# Patient Record
Sex: MALE | Race: WHITE | NOT HISPANIC OR LATINO | URBAN - METROPOLITAN AREA
[De-identification: names, ages, dates, MRNs, and addresses within clinical notes are randomized per-mention and may not be internally consistent; named-entity substitution may affect disease eponyms.]

---

## 2017-02-07 ENCOUNTER — OFFICE (OUTPATIENT)
Dept: URBAN - METROPOLITAN AREA CLINIC 33 | Facility: CLINIC | Age: 52
End: 2017-02-07

## 2017-02-07 VITALS
WEIGHT: 209 LBS | HEART RATE: 79 BPM | SYSTOLIC BLOOD PRESSURE: 124 MMHG | HEIGHT: 74 IN | DIASTOLIC BLOOD PRESSURE: 77 MMHG | TEMPERATURE: 97.2 F

## 2017-02-07 DIAGNOSIS — R74.8 ABNORMAL LEVELS OF OTHER SERUM ENZYMES: ICD-10-CM

## 2017-02-07 DIAGNOSIS — K51.90 ULCERATIVE COLITIS, UNSPECIFIED, WITHOUT COMPLICATIONS: ICD-10-CM

## 2017-02-07 PROCEDURE — 99214 OFFICE O/P EST MOD 30 MIN: CPT

## 2018-08-10 ENCOUNTER — OFFICE (OUTPATIENT)
Dept: URBAN - METROPOLITAN AREA CLINIC 33 | Facility: CLINIC | Age: 53
End: 2018-08-10

## 2018-08-10 VITALS
HEART RATE: 75 BPM | TEMPERATURE: 97.9 F | WEIGHT: 204 LBS | HEIGHT: 74 IN | SYSTOLIC BLOOD PRESSURE: 141 MMHG | DIASTOLIC BLOOD PRESSURE: 87 MMHG

## 2018-08-10 DIAGNOSIS — K51.90 ULCERATIVE COLITIS, UNSPECIFIED, WITHOUT COMPLICATIONS: ICD-10-CM

## 2018-08-10 DIAGNOSIS — Z79.899 OTHER LONG TERM (CURRENT) DRUG THERAPY: ICD-10-CM

## 2018-08-10 PROCEDURE — 99214 OFFICE O/P EST MOD 30 MIN: CPT

## 2018-08-10 RX ORDER — MESALAMINE 1.2 G/1
TABLET, DELAYED RELEASE ORAL
Qty: 270 | Refills: 3 | Status: ACTIVE

## 2018-08-10 NOTE — SERVICEHPINOTES
54 yo white male presents for f/u ulcerative colitis, diagnosed in 2009 (pan-colitis). He has had a fairly mild disease course with his last 2 colonoscopies in 2015 and 2011 without active colitis findings. He takes generic Lialda, 3 PO daily. He is doing well. Has a daily BM, no blood in stools. He denies abdominal pain, fevers, weight loss, joint pains, eye symptoms. His weight is stable. Prior hx: Patient was originally diagnosed with possible UC in 2009 and was treated with prednisone and Asacol. His symptoms resolved and he DC'd the Asacol and did well until summer 2011 when he developed bloody diarrhea and some lower abdominal discomfort. Was again treated with prednisone and Asacol HD and another colonoscopy did confirm ulcerative colitis. Symptoms resolved and he has stayed on mesalamine since then. He was diagnosed with a-fib in 2014. Had cardioversion and then ablation and has been in NSR for several years. Takes ASA 81 mg. Follows Dr. Garza-cardiologist in [place]. Denies chest pain, PELAYO and heart palpitations.

## 2018-08-11 LAB
CBC (INCLUDES DIFF/PLT): ABSOLUTE BAND NEUTROPHILS: NORMAL CELLS/UL
CBC (INCLUDES DIFF/PLT): ABSOLUTE BASOPHILS: 30 CELLS/UL (ref 0–200)
CBC (INCLUDES DIFF/PLT): ABSOLUTE BLASTS: NORMAL CELLS/UL
CBC (INCLUDES DIFF/PLT): ABSOLUTE EOSINOPHILS: 190 CELLS/UL (ref 15–500)
CBC (INCLUDES DIFF/PLT): ABSOLUTE LYMPHOCYTES: 1892 CELLS/UL (ref 850–3900)
CBC (INCLUDES DIFF/PLT): ABSOLUTE METAMYELOCYTES: NORMAL CELLS/UL
CBC (INCLUDES DIFF/PLT): ABSOLUTE MONOCYTES: 958 CELLS/UL — HIGH (ref 200–950)
CBC (INCLUDES DIFF/PLT): ABSOLUTE MYELOCYTES: NORMAL CELLS/UL
CBC (INCLUDES DIFF/PLT): ABSOLUTE NEUTROPHILS: 4530 CELLS/UL (ref 1500–7800)
CBC (INCLUDES DIFF/PLT): ABSOLUTE NUCLEATED RBC: NORMAL CELLS/UL
CBC (INCLUDES DIFF/PLT): ABSOLUTE PROMYELOCYTES: NORMAL CELLS/UL
CBC (INCLUDES DIFF/PLT): BAND NEUTROPHILS: NORMAL %
CBC (INCLUDES DIFF/PLT): BASOPHILS: 0.4 %
CBC (INCLUDES DIFF/PLT): BLASTS: NORMAL %
CBC (INCLUDES DIFF/PLT): COMMENT(S): NORMAL
CBC (INCLUDES DIFF/PLT): EOSINOPHILS: 2.5 %
CBC (INCLUDES DIFF/PLT): HEMATOCRIT: 43.4 % (ref 38.5–50)
CBC (INCLUDES DIFF/PLT): HEMOGLOBIN: 14.4 G/DL (ref 13.2–17.1)
CBC (INCLUDES DIFF/PLT): LYMPHOCYTES: 24.9 %
CBC (INCLUDES DIFF/PLT): MCH: 31.9 PG (ref 27–33)
CBC (INCLUDES DIFF/PLT): MCHC: 33.2 G/DL (ref 32–36)
CBC (INCLUDES DIFF/PLT): MCV: 96 FL (ref 80–100)
CBC (INCLUDES DIFF/PLT): METAMYELOCYTES: NORMAL %
CBC (INCLUDES DIFF/PLT): MONOCYTES: 12.6 %
CBC (INCLUDES DIFF/PLT): MPV: 11.7 FL (ref 7.5–12.5)
CBC (INCLUDES DIFF/PLT): MYELOCYTES: NORMAL %
CBC (INCLUDES DIFF/PLT): NEUTROPHILS: 59.6 %
CBC (INCLUDES DIFF/PLT): NUCLEATED RBC: NORMAL /100 WBC
CBC (INCLUDES DIFF/PLT): PLATELET COUNT: 234 THOUSAND/UL (ref 140–400)
CBC (INCLUDES DIFF/PLT): PROMYELOCYTES: NORMAL %
CBC (INCLUDES DIFF/PLT): RDW: 12.6 % (ref 11–15)
CBC (INCLUDES DIFF/PLT): REACTIVE LYMPHOCYTES: NORMAL %
CBC (INCLUDES DIFF/PLT): RED BLOOD CELL COUNT: 4.52 MILLION/UL (ref 4.2–5.8)
CBC (INCLUDES DIFF/PLT): WHITE BLOOD CELL COUNT: 7.6 THOUSAND/UL (ref 3.8–10.8)
COMPREHENSIVE METABOLIC PANEL: ALBUMIN/GLOBULIN RATIO: 2.1 (CALC) (ref 1–2.5)
COMPREHENSIVE METABOLIC PANEL: ALBUMIN: 4.7 G/DL (ref 3.6–5.1)
COMPREHENSIVE METABOLIC PANEL: ALKALINE PHOSPHATASE: 70 U/L (ref 40–115)
COMPREHENSIVE METABOLIC PANEL: ALT: 17 U/L (ref 9–46)
COMPREHENSIVE METABOLIC PANEL: AST: 17 U/L (ref 10–35)
COMPREHENSIVE METABOLIC PANEL: BILIRUBIN, TOTAL: 0.4 MG/DL (ref 0.2–1.2)
COMPREHENSIVE METABOLIC PANEL: BUN/CREATININE RATIO: (no result) (CALC)
COMPREHENSIVE METABOLIC PANEL: CALCIUM: 9.6 MG/DL (ref 8.6–10.3)
COMPREHENSIVE METABOLIC PANEL: CARBON DIOXIDE: 27 MMOL/L (ref 20–32)
COMPREHENSIVE METABOLIC PANEL: CHLORIDE: 104 MMOL/L (ref 98–110)
COMPREHENSIVE METABOLIC PANEL: CREATININE: 0.94 MG/DL (ref 0.7–1.33)
COMPREHENSIVE METABOLIC PANEL: EGFR AFRICAN AMERICAN: 108 ML/MIN/1.73M2 (ref 60–?)
COMPREHENSIVE METABOLIC PANEL: EGFR NON-AFR. AMERICAN: 93 ML/MIN/1.73M2 (ref 60–?)
COMPREHENSIVE METABOLIC PANEL: GLOBULIN: 2.2 G/DL (CALC) (ref 1.9–3.7)
COMPREHENSIVE METABOLIC PANEL: GLUCOSE: 98 MG/DL (ref 65–99)
COMPREHENSIVE METABOLIC PANEL: POTASSIUM: 4.2 MMOL/L (ref 3.5–5.3)
COMPREHENSIVE METABOLIC PANEL: PROTEIN, TOTAL: 6.9 G/DL (ref 6.1–8.1)
COMPREHENSIVE METABOLIC PANEL: SODIUM: 141 MMOL/L (ref 135–146)
COMPREHENSIVE METABOLIC PANEL: UREA NITROGEN (BUN): 14 MG/DL (ref 7–25)

## 2018-11-05 ENCOUNTER — OFFICE (OUTPATIENT)
Dept: URBAN - METROPOLITAN AREA CLINIC 32 | Facility: CLINIC | Age: 53
End: 2018-11-05

## 2018-11-05 ENCOUNTER — OFFICE (OUTPATIENT)
Dept: URBAN - METROPOLITAN AREA PATHOLOGY 17 | Facility: PATHOLOGY | Age: 53
End: 2018-11-05

## 2018-11-05 VITALS
WEIGHT: 204 LBS | SYSTOLIC BLOOD PRESSURE: 131 MMHG | SYSTOLIC BLOOD PRESSURE: 110 MMHG | DIASTOLIC BLOOD PRESSURE: 64 MMHG | RESPIRATION RATE: 149 BRPM | SYSTOLIC BLOOD PRESSURE: 105 MMHG | RESPIRATION RATE: 16 BRPM | DIASTOLIC BLOOD PRESSURE: 69 MMHG | HEART RATE: 61 BPM | HEART RATE: 60 BPM | DIASTOLIC BLOOD PRESSURE: 76 MMHG | HEART RATE: 66 BPM | OXYGEN SATURATION: 96 % | SYSTOLIC BLOOD PRESSURE: 133 MMHG | SYSTOLIC BLOOD PRESSURE: 127 MMHG | SYSTOLIC BLOOD PRESSURE: 120 MMHG | SYSTOLIC BLOOD PRESSURE: 106 MMHG | DIASTOLIC BLOOD PRESSURE: 80 MMHG | HEART RATE: 65 BPM | DIASTOLIC BLOOD PRESSURE: 74 MMHG | TEMPERATURE: 97.5 F | HEIGHT: 74 IN | HEART RATE: 62 BPM | DIASTOLIC BLOOD PRESSURE: 63 MMHG | DIASTOLIC BLOOD PRESSURE: 82 MMHG | SYSTOLIC BLOOD PRESSURE: 115 MMHG | HEART RATE: 67 BPM | OXYGEN SATURATION: 99 % | OXYGEN SATURATION: 100 % | HEART RATE: 70 BPM | TEMPERATURE: 97.9 F | DIASTOLIC BLOOD PRESSURE: 67 MMHG | RESPIRATION RATE: 20 BRPM | OXYGEN SATURATION: 98 % | RESPIRATION RATE: 15 BRPM | HEART RATE: 59 BPM

## 2018-11-05 DIAGNOSIS — K51.00 ULCERATIVE (CHRONIC) PANCOLITIS WITHOUT COMPLICATIONS: ICD-10-CM

## 2018-11-05 DIAGNOSIS — K52.89 OTHER SPECIFIED NONINFECTIVE GASTROENTERITIS AND COLITIS: ICD-10-CM

## 2018-11-05 PROBLEM — K52.9 INFLAMMATORY BOWEL DISEASE OF THE INTESTINE IF MORE PRECISE DIAGNOSIS OR DETERMINATION OF THE EXTENT/SEVERITY OF ACTIVITY OF DISEASE WILL INFLUENCE IMMEDIATE/FUTURE MANAGEMENT: Status: ACTIVE | Noted: 2018-11-05

## 2018-11-05 PROCEDURE — 45380 COLONOSCOPY AND BIOPSY: CPT

## 2018-11-05 PROCEDURE — 88305 TISSUE EXAM BY PATHOLOGIST: CPT

## 2019-12-09 ENCOUNTER — OFFICE (OUTPATIENT)
Dept: URBAN - METROPOLITAN AREA CLINIC 78 | Facility: CLINIC | Age: 54
End: 2019-12-09

## 2019-12-09 VITALS
HEART RATE: 77 BPM | TEMPERATURE: 97.9 F | DIASTOLIC BLOOD PRESSURE: 88 MMHG | SYSTOLIC BLOOD PRESSURE: 151 MMHG | HEIGHT: 74 IN | WEIGHT: 197 LBS

## 2019-12-09 DIAGNOSIS — K51.90 ULCERATIVE COLITIS, UNSPECIFIED, WITHOUT COMPLICATIONS: ICD-10-CM

## 2019-12-09 PROCEDURE — 99214 OFFICE O/P EST MOD 30 MIN: CPT

## 2019-12-09 RX ORDER — MESALAMINE 1.2 G/1
TABLET, DELAYED RELEASE ORAL
Qty: 360 | Refills: 3 | Status: COMPLETED
End: 2024-06-07

## 2019-12-09 NOTE — SERVICEHPINOTES
CLAUDE MENDOZA   is a   54   male who presents for follow up. Hx of pancolitis (UC), diagnosed in 2009. Has been taking mesalamine 1.2 gram 3 tabs daily and has been doing well. BRLast colonoscopy was done in 11/2018 and it was unremarkable including biopsies.BRMoves bowel once daily. Denies blood in stool, melena, diarrhea or weight loss. BROccasional gas pain. Eats well. Denies nausea, vomiting, dysphagia, acid reflux or dyspepsia. BR

## 2020-04-17 ENCOUNTER — OFFICE (OUTPATIENT)
Dept: URBAN - METROPOLITAN AREA CLINIC 34 | Facility: CLINIC | Age: 55
End: 2020-04-17

## 2020-04-17 VITALS — WEIGHT: 195 LBS | HEIGHT: 74 IN

## 2020-04-17 DIAGNOSIS — R19.7 DIARRHEA, UNSPECIFIED: ICD-10-CM

## 2020-04-17 DIAGNOSIS — K63.3 ULCER OF INTESTINE: ICD-10-CM

## 2020-04-17 DIAGNOSIS — K92.1 MELENA: ICD-10-CM

## 2020-04-17 PROCEDURE — 99214 OFFICE O/P EST MOD 30 MIN: CPT | Mod: GQ | Performed by: NURSE PRACTITIONER

## 2020-04-17 NOTE — SERVICENOTES
I have reviewed the history, physical exam, assessment and management plans.  I concur with or have edited all elements of her note.
Patient's visit was conducted through zoom telecommunication. Patient consented before the start of visit as to understanding of privacy concerns, possible technological failure, and their responsibility of carrying out instructions of plan.

## 2020-04-17 NOTE — SERVICEHPINOTES
PATIENT VERIFIED BY DATE OF BIRTH AND NAME. Patient has been consented for this telecommunication visit. Has been experiencing flare up symptoms since 3 weeks ago. BRMoves bowel up to 5-10 times daily. Stool consistency is between type 4-7. + Bloody stools. Slight pain at lower abdomen. + Mucus in stools and occasional urgency to move bowel. Denies nocturnal diarrhea or weight loss. Denies taking NSAIDs. BRDenies nausea, vomiting, dysphagia, acid reflux or epigastric pain. BRDenies recent medication changes or antibiotic use. Denies sick person contact or recent traveling. BRHas been taking mesalamine 1.2 gram, 3 tabs daily. BRJoint pain and muscle pain. ROS as stated above, all others negative. BR

## 2020-04-21 LAB
AMBIG ABBREV CMP14 DEFAULT: (no result)
C-REACTIVE PROTEIN, QUANT: 20 MG/L — HIGH (ref 0–10)
CBC WITH DIFFERENTIAL/PLATELET: BASO (ABSOLUTE): 0.1 X10E3/UL (ref 0–0.2)
CBC WITH DIFFERENTIAL/PLATELET: BASOS: 1 %
CBC WITH DIFFERENTIAL/PLATELET: EOS (ABSOLUTE): 0.3 X10E3/UL (ref 0–0.4)
CBC WITH DIFFERENTIAL/PLATELET: EOS: 3 %
CBC WITH DIFFERENTIAL/PLATELET: HEMATOCRIT: 41.5 % (ref 37.5–51)
CBC WITH DIFFERENTIAL/PLATELET: HEMOGLOBIN: 13.9 G/DL (ref 13–17.7)
CBC WITH DIFFERENTIAL/PLATELET: IMMATURE GRANS (ABS): 0 X10E3/UL (ref 0–0.1)
CBC WITH DIFFERENTIAL/PLATELET: IMMATURE GRANULOCYTES: 0 %
CBC WITH DIFFERENTIAL/PLATELET: LYMPHS (ABSOLUTE): 1.8 X10E3/UL (ref 0.7–3.1)
CBC WITH DIFFERENTIAL/PLATELET: LYMPHS: 17 %
CBC WITH DIFFERENTIAL/PLATELET: MCH: 31.8 PG (ref 26.6–33)
CBC WITH DIFFERENTIAL/PLATELET: MCHC: 33.5 G/DL (ref 31.5–35.7)
CBC WITH DIFFERENTIAL/PLATELET: MCV: 95 FL (ref 79–97)
CBC WITH DIFFERENTIAL/PLATELET: MONOCYTES(ABSOLUTE): 1.6 X10E3/UL — HIGH (ref 0.1–0.9)
CBC WITH DIFFERENTIAL/PLATELET: MONOCYTES: 14 %
CBC WITH DIFFERENTIAL/PLATELET: NEUTROPHILS (ABSOLUTE): 7.3 X10E3/UL — HIGH (ref 1.4–7)
CBC WITH DIFFERENTIAL/PLATELET: NEUTROPHILS: 65 %
CBC WITH DIFFERENTIAL/PLATELET: PLATELETS: 309 X10E3/UL (ref 150–450)
CBC WITH DIFFERENTIAL/PLATELET: RBC: 4.37 X10E6/UL (ref 4.14–5.8)
CBC WITH DIFFERENTIAL/PLATELET: RDW: 12.2 % (ref 11.6–15.4)
CBC WITH DIFFERENTIAL/PLATELET: WBC: 11.1 X10E3/UL — HIGH (ref 3.4–10.8)
CELIAC DISEASE COMPREHENSIVE: DEAMIDATED GLIADIN ABS, IGA: 5 UNITS (ref 0–19)
CELIAC DISEASE COMPREHENSIVE: DEAMIDATED GLIADIN ABS, IGG: 2 UNITS (ref 0–19)
CELIAC DISEASE COMPREHENSIVE: ENDOMYSIAL ANTIBODY IGA: NEGATIVE
CELIAC DISEASE COMPREHENSIVE: IMMUNOGLOBULIN A, QN, SERUM: 216 MG/DL (ref 90–386)
CELIAC DISEASE COMPREHENSIVE: T-TRANSGLUTAMINASE (TTG) IGA: <2 U/ML
CELIAC DISEASE COMPREHENSIVE: T-TRANSGLUTAMINASE (TTG) IGG: 3 U/ML (ref 0–5)
COMP. METABOLIC PANEL (14): A/G RATIO: 2 (ref 1.2–2.2)
COMP. METABOLIC PANEL (14): ALBUMIN: 4.7 G/DL (ref 3.8–4.9)
COMP. METABOLIC PANEL (14): ALKALINE PHOSPHATASE: 110 IU/L (ref 39–117)
COMP. METABOLIC PANEL (14): ALT (SGPT): 30 IU/L (ref 0–44)
COMP. METABOLIC PANEL (14): AST (SGOT): 25 IU/L (ref 0–40)
COMP. METABOLIC PANEL (14): BILIRUBIN, TOTAL: <0.2 MG/DL
COMP. METABOLIC PANEL (14): BUN/CREATININE RATIO: 19 (ref 9–20)
COMP. METABOLIC PANEL (14): BUN: 15 MG/DL (ref 6–24)
COMP. METABOLIC PANEL (14): CALCIUM: 10.1 MG/DL (ref 8.7–10.2)
COMP. METABOLIC PANEL (14): CARBON DIOXIDE, TOTAL: 29 MMOL/L (ref 20–29)
COMP. METABOLIC PANEL (14): CHLORIDE: 99 MMOL/L (ref 96–106)
COMP. METABOLIC PANEL (14): CREATININE: 0.79 MG/DL (ref 0.76–1.27)
COMP. METABOLIC PANEL (14): EGFR IF AFRICN AM: 119 ML/MIN/1.73 (ref 59–?)
COMP. METABOLIC PANEL (14): EGFR IF NONAFRICN AM: 103 ML/MIN/1.73 (ref 59–?)
COMP. METABOLIC PANEL (14): GLOBULIN, TOTAL: 2.3 G/DL (ref 1.5–4.5)
COMP. METABOLIC PANEL (14): GLUCOSE: 102 MG/DL — HIGH (ref 65–99)
COMP. METABOLIC PANEL (14): POTASSIUM: 4.6 MMOL/L (ref 3.5–5.2)
COMP. METABOLIC PANEL (14): PROTEIN, TOTAL: 7 G/DL (ref 6–8.5)
COMP. METABOLIC PANEL (14): SODIUM: 141 MMOL/L (ref 134–144)
SEDIMENTATION RATE-WESTERGREN: 13 MM/HR (ref 0–30)

## 2020-04-22 LAB
C DIFFICILE TOXIN GENE NAA: NEGATIVE
CALPROTECTIN, FECAL: 341 UG/G — HIGH (ref 0–120)
RESULT: RESULT 1: (no result)
RESULT: RESULT 1: (no result)
STOOL CULTURE: CAMPYLOBACTER CULTURE: (no result)
STOOL CULTURE: E COLI SHIGA TOXIN EIA: NEGATIVE
STOOL CULTURE: SALMONELLA/SHIGELLA SCREEN: (no result)

## 2020-06-18 ENCOUNTER — OFFICE (OUTPATIENT)
Dept: URBAN - METROPOLITAN AREA TELEHEALTH 12 | Facility: TELEHEALTH | Age: 55
End: 2020-06-18

## 2020-06-18 VITALS — HEIGHT: 74 IN | WEIGHT: 185 LBS

## 2020-06-18 DIAGNOSIS — K51.80 OTHER ULCERATIVE COLITIS WITHOUT COMPLICATIONS: ICD-10-CM

## 2020-06-18 PROCEDURE — 99215 OFFICE O/P EST HI 40 MIN: CPT | Mod: GQ

## 2020-06-18 NOTE — SERVICENOTES
I have reviewed the history, physical exam, assessment and management plans.  I concur with or have edited all elements of her note.

 Patient's visit was conducted through video telecommunication. Patient consented before the start of visit as to understanding of privacy concerns, possible technological failure, and their responsibility of carrying out instructions of plan.

## 2020-06-18 NOTE — SERVICEHPINOTES
PATIENT VERIFIED BY DATE OF BIRTH AND NAME. Patient has been consented for this telecommunication visit. BRPt with h/o pancolitis dx in 2009. He was well controlled on mesalamine with only 4-5 flares since diagnosis until recently. Started having flare-type symptoms in April. Stool studies showed fecal calpro of 341, neg c diff CRP of 20. He was given a slow prednisone taper and finished this approx. 2 weeks ago with ongoing symptoms. States he felt better for only a couple days after starting prednisone. Symptoms are actually worse now. He has continued on mesalamine 3.6g/day. He is having a lot of lower abd pain, diarrhea, urgency, tenesmus, and blood in stool. BMs 10-20x/day having blood most of the time. No joint pains or fever. Nocturnal BMs at least a couple times per night. BRHis last colonoscopy was 11/2018 which was unremarkable as were biopsies with no signs of active colitis. BRROS as per HPI, otherwise negative.

## 2020-06-26 ENCOUNTER — OFFICE (OUTPATIENT)
Dept: URBAN - METROPOLITAN AREA CLINIC 34 | Facility: CLINIC | Age: 55
End: 2020-06-26
Payer: COMMERCIAL

## 2020-06-26 DIAGNOSIS — Z11.59 ENCOUNTER FOR SCREENING FOR OTHER VIRAL DISEASES: ICD-10-CM

## 2020-06-26 PROCEDURE — 99211 OFF/OP EST MAY X REQ PHY/QHP: CPT | Mod: CS | Performed by: INTERNAL MEDICINE

## 2020-06-27 LAB
CBC (INCLUDES DIFF/PLT): ABSOLUTE BAND NEUTROPHILS: NORMAL CELLS/UL
CBC (INCLUDES DIFF/PLT): ABSOLUTE BASOPHILS: 42 CELLS/UL (ref 0–200)
CBC (INCLUDES DIFF/PLT): ABSOLUTE BLASTS: NORMAL CELLS/UL
CBC (INCLUDES DIFF/PLT): ABSOLUTE EOSINOPHILS: 609 CELLS/UL — HIGH (ref 15–500)
CBC (INCLUDES DIFF/PLT): ABSOLUTE LYMPHOCYTES: 1628 CELLS/UL (ref 850–3900)
CBC (INCLUDES DIFF/PLT): ABSOLUTE METAMYELOCYTES: NORMAL CELLS/UL
CBC (INCLUDES DIFF/PLT): ABSOLUTE MONOCYTES: 1449 CELLS/UL — HIGH (ref 200–950)
CBC (INCLUDES DIFF/PLT): ABSOLUTE MYELOCYTES: NORMAL CELLS/UL
CBC (INCLUDES DIFF/PLT): ABSOLUTE NEUTROPHILS: 6773 CELLS/UL (ref 1500–7800)
CBC (INCLUDES DIFF/PLT): ABSOLUTE NUCLEATED RBC: NORMAL CELLS/UL
CBC (INCLUDES DIFF/PLT): ABSOLUTE PROMYELOCYTES: NORMAL CELLS/UL
CBC (INCLUDES DIFF/PLT): BAND NEUTROPHILS: NORMAL %
CBC (INCLUDES DIFF/PLT): BASOPHILS: 0.4 %
CBC (INCLUDES DIFF/PLT): BLASTS: NORMAL %
CBC (INCLUDES DIFF/PLT): COMMENT(S): NORMAL
CBC (INCLUDES DIFF/PLT): EOSINOPHILS: 5.8 %
CBC (INCLUDES DIFF/PLT): HEMATOCRIT: 35.2 % — LOW (ref 38.5–50)
CBC (INCLUDES DIFF/PLT): HEMOGLOBIN: 11.8 G/DL — LOW (ref 13.2–17.1)
CBC (INCLUDES DIFF/PLT): LYMPHOCYTES: 15.5 %
CBC (INCLUDES DIFF/PLT): MCH: 31.6 PG (ref 27–33)
CBC (INCLUDES DIFF/PLT): MCHC: 33.5 G/DL (ref 32–36)
CBC (INCLUDES DIFF/PLT): MCV: 94.1 FL (ref 80–100)
CBC (INCLUDES DIFF/PLT): METAMYELOCYTES: NORMAL %
CBC (INCLUDES DIFF/PLT): MONOCYTES: 13.8 %
CBC (INCLUDES DIFF/PLT): MPV: 11.3 FL (ref 7.5–12.5)
CBC (INCLUDES DIFF/PLT): MYELOCYTES: NORMAL %
CBC (INCLUDES DIFF/PLT): NEUTROPHILS: 64.5 %
CBC (INCLUDES DIFF/PLT): NUCLEATED RBC: NORMAL /100 WBC
CBC (INCLUDES DIFF/PLT): PLATELET COUNT: 436 THOUSAND/UL — HIGH (ref 140–400)
CBC (INCLUDES DIFF/PLT): PROMYELOCYTES: NORMAL %
CBC (INCLUDES DIFF/PLT): RDW: 11.8 % (ref 11–15)
CBC (INCLUDES DIFF/PLT): REACTIVE LYMPHOCYTES: NORMAL %
CBC (INCLUDES DIFF/PLT): RED BLOOD CELL COUNT: 3.74 MILLION/UL — LOW (ref 4.2–5.8)
CBC (INCLUDES DIFF/PLT): WHITE BLOOD CELL COUNT: 10.5 THOUSAND/UL (ref 3.8–10.8)
COMPREHENSIVE METABOLIC PANEL: ALBUMIN/GLOBULIN RATIO: 1.3 (CALC) (ref 1–2.5)
COMPREHENSIVE METABOLIC PANEL: ALBUMIN: 3.6 G/DL (ref 3.6–5.1)
COMPREHENSIVE METABOLIC PANEL: ALKALINE PHOSPHATASE: 80 U/L (ref 35–144)
COMPREHENSIVE METABOLIC PANEL: ALT: 21 U/L (ref 9–46)
COMPREHENSIVE METABOLIC PANEL: AST: 20 U/L (ref 10–35)
COMPREHENSIVE METABOLIC PANEL: BILIRUBIN, TOTAL: 0.3 MG/DL (ref 0.2–1.2)
COMPREHENSIVE METABOLIC PANEL: BUN/CREATININE RATIO: (no result) (CALC)
COMPREHENSIVE METABOLIC PANEL: CALCIUM: 9.3 MG/DL (ref 8.6–10.3)
COMPREHENSIVE METABOLIC PANEL: CARBON DIOXIDE: 29 MMOL/L (ref 20–32)
COMPREHENSIVE METABOLIC PANEL: CHLORIDE: 105 MMOL/L (ref 98–110)
COMPREHENSIVE METABOLIC PANEL: CREATININE: 0.84 MG/DL (ref 0.7–1.33)
COMPREHENSIVE METABOLIC PANEL: EGFR AFRICAN AMERICAN: 115 ML/MIN/1.73M2 (ref 60–?)
COMPREHENSIVE METABOLIC PANEL: EGFR NON-AFR. AMERICAN: 99 ML/MIN/1.73M2 (ref 60–?)
COMPREHENSIVE METABOLIC PANEL: GLOBULIN: 2.7 G/DL (CALC) (ref 1.9–3.7)
COMPREHENSIVE METABOLIC PANEL: GLUCOSE: 126 MG/DL — HIGH (ref 65–99)
COMPREHENSIVE METABOLIC PANEL: POTASSIUM: 4.8 MMOL/L (ref 3.5–5.3)
COMPREHENSIVE METABOLIC PANEL: PROTEIN, TOTAL: 6.3 G/DL (ref 6.1–8.1)
COMPREHENSIVE METABOLIC PANEL: SODIUM: 141 MMOL/L (ref 135–146)
COMPREHENSIVE METABOLIC PANEL: UREA NITROGEN (BUN): 12 MG/DL (ref 7–25)
HEPATITIS A AB, TOTAL: NORMAL
HEPATITIS B CORE AB TOTAL: NORMAL
HEPATITIS B SURFACE AB IMMUNITY, QN: <5 MIU/ML — LOW
HEPATITIS B SURFACE ANTIGEN W/REFL CONFIRM: CONFIRMATION: NORMAL
HEPATITIS B SURFACE ANTIGEN W/REFL CONFIRM: HEPATITIS B SURFACE ANTIGEN: NORMAL
Lab: (no result)
Lab: 260 U/ML — HIGH
Lab: 593 U/ML — HIGH
Lab: <36 U/ML
QUANTIFERON(R)-TB GOLD PLUS, 1 TUBE: MITOGEN-NIL: 7.32 IU/ML
QUANTIFERON(R)-TB GOLD PLUS, 1 TUBE: NEGATIVE
QUANTIFERON(R)-TB GOLD PLUS, 1 TUBE: NIL: 0.02 IU/ML
QUANTIFERON(R)-TB GOLD PLUS, 1 TUBE: TB1-NIL: 0.01 IU/ML
QUANTIFERON(R)-TB GOLD PLUS, 1 TUBE: TB2-NIL: 0 IU/ML
TPMT ACTIVITY: 20 NMOL/HR/ML RBC

## 2020-07-01 ENCOUNTER — OFFICE (OUTPATIENT)
Dept: URBAN - METROPOLITAN AREA PATHOLOGY 18 | Facility: PATHOLOGY | Age: 55
End: 2020-07-01

## 2020-07-01 ENCOUNTER — OFFICE (OUTPATIENT)
Dept: URBAN - METROPOLITAN AREA CLINIC 30 | Facility: CLINIC | Age: 55
End: 2020-07-01

## 2020-07-01 VITALS
SYSTOLIC BLOOD PRESSURE: 144 MMHG | RESPIRATION RATE: 16 BRPM | DIASTOLIC BLOOD PRESSURE: 80 MMHG | SYSTOLIC BLOOD PRESSURE: 130 MMHG | HEART RATE: 89 BPM | TEMPERATURE: 98.6 F | RESPIRATION RATE: 28 BRPM | OXYGEN SATURATION: 94 % | WEIGHT: 180 LBS | SYSTOLIC BLOOD PRESSURE: 134 MMHG | HEART RATE: 98 BPM | TEMPERATURE: 98.1 F | DIASTOLIC BLOOD PRESSURE: 70 MMHG | RESPIRATION RATE: 26 BRPM | OXYGEN SATURATION: 96 % | HEIGHT: 74 IN | DIASTOLIC BLOOD PRESSURE: 65 MMHG | SYSTOLIC BLOOD PRESSURE: 118 MMHG | DIASTOLIC BLOOD PRESSURE: 69 MMHG | OXYGEN SATURATION: 95 % | HEART RATE: 83 BPM | DIASTOLIC BLOOD PRESSURE: 67 MMHG | HEART RATE: 81 BPM | RESPIRATION RATE: 30 BRPM | RESPIRATION RATE: 18 BRPM | SYSTOLIC BLOOD PRESSURE: 128 MMHG | HEART RATE: 86 BPM | DIASTOLIC BLOOD PRESSURE: 77 MMHG | HEART RATE: 80 BPM | OXYGEN SATURATION: 100 %

## 2020-07-01 DIAGNOSIS — K92.1 MELENA: ICD-10-CM

## 2020-07-01 DIAGNOSIS — K63.3 ULCER OF INTESTINE: ICD-10-CM

## 2020-07-01 DIAGNOSIS — K51.80 OTHER ULCERATIVE COLITIS WITHOUT COMPLICATIONS: ICD-10-CM

## 2020-07-01 DIAGNOSIS — R19.7 DIARRHEA, UNSPECIFIED: ICD-10-CM

## 2020-07-01 DIAGNOSIS — K63.89 OTHER SPECIFIED DISEASES OF INTESTINE: ICD-10-CM

## 2020-07-01 PROCEDURE — 88305 TISSUE EXAM BY PATHOLOGIST: CPT | Performed by: PATHOLOGY

## 2020-07-01 RX ORDER — PREDNISONE 10 MG/1
TABLET ORAL
Qty: 140 | Refills: 0 | Status: COMPLETED
Start: 2020-07-01 | End: 2020-09-25

## 2020-07-08 ENCOUNTER — OFFICE (OUTPATIENT)
Dept: URBAN - METROPOLITAN AREA TELEHEALTH 12 | Facility: TELEHEALTH | Age: 55
End: 2020-07-08

## 2020-07-08 VITALS — HEIGHT: 74 IN | WEIGHT: 178 LBS

## 2020-07-08 DIAGNOSIS — R19.7 DIARRHEA, UNSPECIFIED: ICD-10-CM

## 2020-07-08 DIAGNOSIS — K51.90 ULCERATIVE COLITIS, UNSPECIFIED, WITHOUT COMPLICATIONS: ICD-10-CM

## 2020-07-08 PROCEDURE — 99215 OFFICE O/P EST HI 40 MIN: CPT | Mod: GQ

## 2020-07-08 RX ORDER — DICYCLOMINE HYDROCHLORIDE 20 MG/1
TABLET ORAL
Qty: 0 | Refills: 0 | Status: COMPLETED
End: 2020-09-25

## 2020-07-08 NOTE — SERVICEHPINOTES
PATIENT VERIFIED BY DATE OF BIRTH AND NAME. Patient has been consented for this telecommunication visit. Annita presents for follow up. Colonoscopy on 7/1 consistent with UC in descending, sigmoid colon, and rectum. Path report is not yet back. he was started on prednisone after colonoscopy but symptoms have not improved until today. For the week following had ongoing cramping, tenesmus, urgency, bleeding, etc. States steroids did not make him feel any worse, however.  BMs still around 20x/day. Cramping is now only when he has to have a BM. No fever.BRFONT style="BACKGROUND-COLOR: #ffffcc" visited="true"ROS as per HPI, otherwise negative.  /FONT

## 2020-07-10 LAB
C DIFFICILE TOXIN GENE NAA: NEGATIVE
GI PROFILE, STOOL, PCR: ADENOVIRUS F 40/41: NOT DETECTED
GI PROFILE, STOOL, PCR: ASTROVIRUS: NOT DETECTED
GI PROFILE, STOOL, PCR: C DIFFICILE TOXIN A/B: DETECTED
GI PROFILE, STOOL, PCR: CAMPYLOBACTER: NOT DETECTED
GI PROFILE, STOOL, PCR: CRYPTOSPORIDIUM: NOT DETECTED
GI PROFILE, STOOL, PCR: CYCLOSPORA CAYETANENSIS: NOT DETECTED
GI PROFILE, STOOL, PCR: E COLI O157: (no result)
GI PROFILE, STOOL, PCR: ENTAMOEBA HISTOLYTICA: NOT DETECTED
GI PROFILE, STOOL, PCR: ENTEROAGGREGATIVE E COLI: NOT DETECTED
GI PROFILE, STOOL, PCR: ENTEROPATHOGENIC E COLI: NOT DETECTED
GI PROFILE, STOOL, PCR: ENTEROTOXIGENIC E COLI: NOT DETECTED
GI PROFILE, STOOL, PCR: GIARDIA LAMBLIA: NOT DETECTED
GI PROFILE, STOOL, PCR: NOROVIRUS GI/GII: NOT DETECTED
GI PROFILE, STOOL, PCR: PLESIOMONAS SHIGELLOIDES: NOT DETECTED
GI PROFILE, STOOL, PCR: ROTAVIRUS A: NOT DETECTED
GI PROFILE, STOOL, PCR: SALMONELLA: NOT DETECTED
GI PROFILE, STOOL, PCR: SAPOVIRUS: NOT DETECTED
GI PROFILE, STOOL, PCR: SHIGA-TOXIN-PRODUCING E COLI: NOT DETECTED
GI PROFILE, STOOL, PCR: SHIGELLA/ENTEROINVASIVE E COLI: NOT DETECTED
GI PROFILE, STOOL, PCR: VIBRIO CHOLERAE: NOT DETECTED
GI PROFILE, STOOL, PCR: VIBRIO: NOT DETECTED
GI PROFILE, STOOL, PCR: YERSINIA ENTEROCOLITICA: NOT DETECTED

## 2020-07-20 ENCOUNTER — INPATIENT HOSPITAL (OUTPATIENT)
Dept: URBAN - METROPOLITAN AREA HOSPITAL 13 | Facility: HOSPITAL | Age: 55
End: 2020-07-20
Payer: COMMERCIAL

## 2020-07-20 DIAGNOSIS — R19.7 DIARRHEA, UNSPECIFIED: ICD-10-CM

## 2020-07-20 DIAGNOSIS — A04.71 ENTEROCOLITIS DUE TO CLOSTRIDIUM DIFFICILE, RECURRENT: ICD-10-CM

## 2020-07-20 DIAGNOSIS — D62 ACUTE POSTHEMORRHAGIC ANEMIA: ICD-10-CM

## 2020-07-20 DIAGNOSIS — K62.5 HEMORRHAGE OF ANUS AND RECTUM: ICD-10-CM

## 2020-07-20 DIAGNOSIS — K50.818 CROHN'S DISEASE OF BOTH SMALL AND LARGE INTESTINE WITH OTHER: ICD-10-CM

## 2020-07-20 PROCEDURE — 99222 1ST HOSP IP/OBS MODERATE 55: CPT | Performed by: INTERNAL MEDICINE

## 2020-07-21 ENCOUNTER — INPATIENT HOSPITAL (OUTPATIENT)
Dept: URBAN - METROPOLITAN AREA HOSPITAL 13 | Facility: HOSPITAL | Age: 55
End: 2020-07-21
Payer: COMMERCIAL

## 2020-07-21 DIAGNOSIS — K50.818 CROHN'S DISEASE OF BOTH SMALL AND LARGE INTESTINE WITH OTHER: ICD-10-CM

## 2020-07-21 DIAGNOSIS — R19.7 DIARRHEA, UNSPECIFIED: ICD-10-CM

## 2020-07-21 DIAGNOSIS — R10.33 PERIUMBILICAL PAIN: ICD-10-CM

## 2020-07-21 DIAGNOSIS — A04.72 ENTEROCOLITIS DUE TO CLOSTRIDIUM DIFFICILE, NOT SPECIFIED AS: ICD-10-CM

## 2020-07-21 DIAGNOSIS — R11.2 NAUSEA WITH VOMITING, UNSPECIFIED: ICD-10-CM

## 2020-07-21 DIAGNOSIS — K92.1 MELENA: ICD-10-CM

## 2020-07-21 DIAGNOSIS — D62 ACUTE POSTHEMORRHAGIC ANEMIA: ICD-10-CM

## 2020-07-21 DIAGNOSIS — K62.5 HEMORRHAGE OF ANUS AND RECTUM: ICD-10-CM

## 2020-07-21 PROCEDURE — 99232 SBSQ HOSP IP/OBS MODERATE 35: CPT | Performed by: NURSE PRACTITIONER

## 2020-07-22 PROCEDURE — 99232 SBSQ HOSP IP/OBS MODERATE 35: CPT | Performed by: NURSE PRACTITIONER

## 2020-07-23 ENCOUNTER — INPATIENT HOSPITAL (OUTPATIENT)
Dept: URBAN - METROPOLITAN AREA HOSPITAL 13 | Facility: HOSPITAL | Age: 55
End: 2020-07-23
Payer: COMMERCIAL

## 2020-07-23 DIAGNOSIS — K50.818 CROHN'S DISEASE OF BOTH SMALL AND LARGE INTESTINE WITH OTHER: ICD-10-CM

## 2020-07-23 DIAGNOSIS — D62 ACUTE POSTHEMORRHAGIC ANEMIA: ICD-10-CM

## 2020-07-23 DIAGNOSIS — A04.71 ENTEROCOLITIS DUE TO CLOSTRIDIUM DIFFICILE, RECURRENT: ICD-10-CM

## 2020-07-23 DIAGNOSIS — K62.5 HEMORRHAGE OF ANUS AND RECTUM: ICD-10-CM

## 2020-07-23 DIAGNOSIS — R19.7 DIARRHEA, UNSPECIFIED: ICD-10-CM

## 2020-07-23 DIAGNOSIS — R10.33 PERIUMBILICAL PAIN: ICD-10-CM

## 2020-07-23 DIAGNOSIS — K92.1 MELENA: ICD-10-CM

## 2020-07-23 DIAGNOSIS — R11.2 NAUSEA WITH VOMITING, UNSPECIFIED: ICD-10-CM

## 2020-07-23 DIAGNOSIS — Z79.52 LONG TERM (CURRENT) USE OF SYSTEMIC STEROIDS: ICD-10-CM

## 2020-07-23 PROCEDURE — 99232 SBSQ HOSP IP/OBS MODERATE 35: CPT | Performed by: NURSE PRACTITIONER

## 2020-07-24 PROCEDURE — 99232 SBSQ HOSP IP/OBS MODERATE 35: CPT | Performed by: NURSE PRACTITIONER

## 2020-07-27 ENCOUNTER — OFFICE (OUTPATIENT)
Dept: URBAN - METROPOLITAN AREA TELEHEALTH 12 | Facility: TELEHEALTH | Age: 55
End: 2020-07-27

## 2020-07-27 VITALS — WEIGHT: 162 LBS | HEIGHT: 74 IN

## 2020-07-27 DIAGNOSIS — R11.0 NAUSEA: ICD-10-CM

## 2020-07-27 DIAGNOSIS — K62.5 HEMORRHAGE OF ANUS AND RECTUM: ICD-10-CM

## 2020-07-27 DIAGNOSIS — R10.9 UNSPECIFIED ABDOMINAL PAIN: ICD-10-CM

## 2020-07-27 DIAGNOSIS — K51.90 ULCERATIVE COLITIS, UNSPECIFIED, WITHOUT COMPLICATIONS: ICD-10-CM

## 2020-07-27 PROCEDURE — 99215 OFFICE O/P EST HI 40 MIN: CPT | Mod: GQ | Performed by: INTERNAL MEDICINE

## 2020-07-27 RX ORDER — MESALAMINE 4 G/60ML
4 ENEMA RECTAL
Qty: 30 | Refills: 1 | Status: COMPLETED
Start: 2020-07-27 | End: 2020-08-05

## 2020-07-27 RX ORDER — MESALAMINE 1000 MG/1
1 SUPPOSITORY RECTAL
Qty: 30 | Refills: 1 | Status: COMPLETED
Start: 2020-07-27 | End: 2020-09-25

## 2020-07-27 NOTE — SERVICEHPINOTES
PATIENT VERIFIED BY DATE OF BIRTH AND NAME. Patient has been consented for this telecommunication visit. Patient with h/o UC diagnosed in 2009 presented recently with symptoms consistent with flare. He was diagnosed with pan-colitis in 2009 has been well-controlled with mesalamine 3 pills daily until recently. Over 10 years he thinks he had a handful of flares which responded to prednisone tapers.  Colonoscopy in 2018 actually revealed no active disease. He first noted symptoms consistent with flare in April 2020. He was negative for C diff at the time. Calprotectin and CRP were elevated. He was started on pred taper and never really improved significantly. He continued to have 10-20 BMs per day. He was scheduled for colonoscopy and had bloodwork in case he needed escalation of therapy. Colonoscopy 7/1 with left sided colitis. He was started on pred taper again and stool studies were checked again. This time the C diff came back positive (7/10). He was started on vancomycin but did not improve after 4-5 days and was becoming more dehydrated. He was admitted to [facility] on 7/20 and discharged on 7/24. He was treated with dificid while there as well as iv solumedrol. He had some good days and some bad days while there. He seemed to clinically improve so he was discharged on po prednisone and po dificid. He continues to have diarrhea. He states he can have up to 20 "BMs" a day. On further questioning he admits to urgency/tenesmus and admits that some of his trips to the bathroom only end in flatus or passage of mucus/blood. He has also started to note that when he does pass stool it seems to be less volume than before. He feels physically drained. He is sticking to a bland diet. He notes abdominal cramping prior to defecation. He notes nausea but no vomiting. He denies any melena. His appetite is up and down. No fevers/chills. ROS as above, otherwise remainder of ROS negative.

## 2020-07-27 NOTE — SERVICENOTES
Patient understands and agrees with plan. Questions/concerns addressed., Patient's visit was conducted through video telecommunication. Patient consented before the start of visit as to understanding of privacy concerns, possible technological failure, and their responsibility of carrying out instructions of plan.

## 2020-08-05 ENCOUNTER — OFFICE (OUTPATIENT)
Dept: URBAN - METROPOLITAN AREA TELEHEALTH 12 | Facility: TELEHEALTH | Age: 55
End: 2020-08-05
Payer: COMMERCIAL

## 2020-08-05 VITALS — HEIGHT: 74 IN | WEIGHT: 158 LBS

## 2020-08-05 DIAGNOSIS — K64.9 UNSPECIFIED HEMORRHOIDS: ICD-10-CM

## 2020-08-05 DIAGNOSIS — A04.72 ENTEROCOLITIS DUE TO CLOSTRIDIUM DIFFICILE, NOT SPECIFIED AS: ICD-10-CM

## 2020-08-05 DIAGNOSIS — R10.9 UNSPECIFIED ABDOMINAL PAIN: ICD-10-CM

## 2020-08-05 DIAGNOSIS — K62.5 HEMORRHAGE OF ANUS AND RECTUM: ICD-10-CM

## 2020-08-05 DIAGNOSIS — K51.90 ULCERATIVE COLITIS, UNSPECIFIED, WITHOUT COMPLICATIONS: ICD-10-CM

## 2020-08-05 PROCEDURE — 99214 OFFICE O/P EST MOD 30 MIN: CPT | Mod: GQ | Performed by: INTERNAL MEDICINE

## 2020-08-05 RX ORDER — HYDROCORTISONE ACETATE 25 MG/1
SUPPOSITORY RECTAL
Qty: 10 | Refills: 0 | Status: COMPLETED
Start: 2020-08-05 | End: 2020-08-14

## 2020-08-05 NOTE — SERVICEHPINOTES
PATIENT VERIFIED BY DATE OF BIRTH AND NAME. Patient has been consented for this telecommunication visit.  He has had continued symptoms of diarrhea and feels like there is no improvement.  He is still having up to 20 BMs per day, often only small amounts. He has had occasional blood in stool which he states has improved. No melena. He notes that cramping is still occurring and is taking dicyclomine 3-4 time a day which helps. He has been unable to use enema/suppository therapy prescribed last time because of painful hemorrhoids. Currently at 20 mg prednisone daily (does not feel that much different than when he was at 40 mg except for less bleeding). He is tentatively scheduled for remicade infusion on 8/14.   ROS as above, otherwise remainder of ROS negative.

## 2020-08-08 LAB
CLOSTRIDIUM DIFFICILE TOXIN/GDH W/REFL TO PCR: (no result)
CLOSTRIDIUM DIFFICILE TOXIN/GDH W/REFL TO PCR: (no result)

## 2020-08-14 ENCOUNTER — OFFICE (OUTPATIENT)
Dept: URBAN - METROPOLITAN AREA CLINIC 34 | Facility: CLINIC | Age: 55
End: 2020-08-14
Payer: COMMERCIAL

## 2020-08-14 DIAGNOSIS — K51.90 ULCERATIVE COLITIS, UNSPECIFIED, WITHOUT COMPLICATIONS: ICD-10-CM

## 2020-08-14 LAB
CALPROTECTIN, STOOL: >8000 MCG/G — HIGH
CLOSTRIDIUM DIFFICILE TOXIN/GDH W/REFL TO PCR: (no result)

## 2020-08-14 PROCEDURE — 96415 CHEMO IV INFUSION ADDL HR: CPT | Performed by: INTERNAL MEDICINE

## 2020-08-14 PROCEDURE — 99070 SPECIAL SUPPLIES PHYS/QHP: CPT | Performed by: INTERNAL MEDICINE

## 2020-08-14 PROCEDURE — 96413 CHEMO IV INFUSION 1 HR: CPT | Performed by: INTERNAL MEDICINE

## 2020-09-25 ENCOUNTER — OFFICE (OUTPATIENT)
Dept: URBAN - METROPOLITAN AREA TELEHEALTH 7 | Facility: TELEHEALTH | Age: 55
End: 2020-09-25
Payer: COMMERCIAL

## 2020-09-25 VITALS — WEIGHT: 170 LBS | HEIGHT: 74 IN

## 2020-09-25 DIAGNOSIS — K51.90 ULCERATIVE COLITIS, UNSPECIFIED, WITHOUT COMPLICATIONS: ICD-10-CM

## 2020-09-25 PROCEDURE — 99214 OFFICE O/P EST MOD 30 MIN: CPT | Mod: GQ | Performed by: INTERNAL MEDICINE

## 2020-09-25 RX ORDER — MESALAMINE 1.2 G/1
TABLET, DELAYED RELEASE ORAL
Qty: 360 | Refills: 3 | Status: COMPLETED
End: 2024-06-07

## 2020-09-25 NOTE — SERVICEHPINOTES
PATIENT VERIFIED BY DATE OF BIRTH AND NAME. Patient has been consented for this telecommunication visit.   CLAUDE MENDOZA   is a   55   male who presents for follow up. In August he was having up to 20 BMs a day and mesalamine/prednisone were note helping enough so plan was to start him on biologic. He was given one dose of remicade but seemed to develop possible adverse effects. He was supposed to have cardiology eval but it has been delayed until October. Interestingly, since his first loading dose of remicade he has done significantly better. He is feeling much better. He states he is back to normal. He is having 1-2 formed BMs per day without blood. He denies any nausea, vomiting, abdominal pain, melena, rectal bleeding, loss of appetite, weight loss, fevers/chills. He is eating well and not restricting his diet.    ROS as above, otherwise remainder of ROS is negative.

## 2020-10-02 LAB
AMBIG ABBREV CMP14 DEFAULT: (no result)
AMYLASE: 72 U/L (ref 31–110)
C-REACTIVE PROTEIN, QUANT: 1 MG/L (ref 0–10)
CBC WITH DIFFERENTIAL/PLATELET: BASO (ABSOLUTE): 0 X10E3/UL (ref 0–0.2)
CBC WITH DIFFERENTIAL/PLATELET: BASOS: 0 %
CBC WITH DIFFERENTIAL/PLATELET: EOS (ABSOLUTE): 0.2 X10E3/UL (ref 0–0.4)
CBC WITH DIFFERENTIAL/PLATELET: EOS: 3 %
CBC WITH DIFFERENTIAL/PLATELET: HEMATOCRIT: 31.7 % — LOW (ref 37.5–51)
CBC WITH DIFFERENTIAL/PLATELET: HEMOGLOBIN: 9.6 G/DL — LOW (ref 13–17.7)
CBC WITH DIFFERENTIAL/PLATELET: IMMATURE GRANS (ABS): 0 X10E3/UL (ref 0–0.1)
CBC WITH DIFFERENTIAL/PLATELET: IMMATURE GRANULOCYTES: 0 %
CBC WITH DIFFERENTIAL/PLATELET: LYMPHS (ABSOLUTE): 1.8 X10E3/UL (ref 0.7–3.1)
CBC WITH DIFFERENTIAL/PLATELET: LYMPHS: 27 %
CBC WITH DIFFERENTIAL/PLATELET: MCH: 26.5 PG — LOW (ref 26.6–33)
CBC WITH DIFFERENTIAL/PLATELET: MCHC: 30.3 G/DL — LOW (ref 31.5–35.7)
CBC WITH DIFFERENTIAL/PLATELET: MCV: 88 FL (ref 79–97)
CBC WITH DIFFERENTIAL/PLATELET: MONOCYTES(ABSOLUTE): 1.2 X10E3/UL — HIGH (ref 0.1–0.9)
CBC WITH DIFFERENTIAL/PLATELET: MONOCYTES: 18 %
CBC WITH DIFFERENTIAL/PLATELET: NEUTROPHILS (ABSOLUTE): 3.4 X10E3/UL (ref 1.4–7)
CBC WITH DIFFERENTIAL/PLATELET: NEUTROPHILS: 52 %
CBC WITH DIFFERENTIAL/PLATELET: PLATELETS: 277 X10E3/UL (ref 150–450)
CBC WITH DIFFERENTIAL/PLATELET: RBC: 3.62 X10E6/UL — LOW (ref 4.14–5.8)
CBC WITH DIFFERENTIAL/PLATELET: RDW: 14.1 % (ref 11.6–15.4)
CBC WITH DIFFERENTIAL/PLATELET: WBC: 6.7 X10E3/UL (ref 3.4–10.8)
COMP. METABOLIC PANEL (14): A/G RATIO: 1.6 (ref 1.2–2.2)
COMP. METABOLIC PANEL (14): ALBUMIN: 4.1 G/DL (ref 3.8–4.9)
COMP. METABOLIC PANEL (14): ALKALINE PHOSPHATASE: 66 IU/L (ref 39–117)
COMP. METABOLIC PANEL (14): ALT (SGPT): 12 IU/L (ref 0–44)
COMP. METABOLIC PANEL (14): AST (SGOT): 15 IU/L (ref 0–40)
COMP. METABOLIC PANEL (14): BILIRUBIN, TOTAL: <0.2 MG/DL
COMP. METABOLIC PANEL (14): BUN/CREATININE RATIO: 16 (ref 9–20)
COMP. METABOLIC PANEL (14): BUN: 12 MG/DL (ref 6–24)
COMP. METABOLIC PANEL (14): CALCIUM: 9.1 MG/DL (ref 8.7–10.2)
COMP. METABOLIC PANEL (14): CARBON DIOXIDE, TOTAL: 26 MMOL/L (ref 20–29)
COMP. METABOLIC PANEL (14): CHLORIDE: 102 MMOL/L (ref 96–106)
COMP. METABOLIC PANEL (14): CREATININE: 0.76 MG/DL (ref 0.76–1.27)
COMP. METABOLIC PANEL (14): EGFR IF AFRICN AM: 120 ML/MIN/1.73 (ref 59–?)
COMP. METABOLIC PANEL (14): EGFR IF NONAFRICN AM: 103 ML/MIN/1.73 (ref 59–?)
COMP. METABOLIC PANEL (14): GLOBULIN, TOTAL: 2.5 G/DL (ref 1.5–4.5)
COMP. METABOLIC PANEL (14): GLUCOSE: 127 MG/DL — HIGH (ref 65–99)
COMP. METABOLIC PANEL (14): POTASSIUM: 4.1 MMOL/L (ref 3.5–5.2)
COMP. METABOLIC PANEL (14): PROTEIN, TOTAL: 6.6 G/DL (ref 6–8.5)
COMP. METABOLIC PANEL (14): SODIUM: 141 MMOL/L (ref 134–144)
INFLIXIMAB (IFX) CONC+ IFX AB: ANTI-INFLIXIMAB ANTIBODY: <22 NG/ML
INFLIXIMAB (IFX) CONC+ IFX AB: INFLIXIMAB DRUG LEVEL: 1.6 UG/ML
LIPASE: 30 U/L (ref 13–78)
SEDIMENTATION RATE-WESTERGREN: 15 MM/HR (ref 0–30)

## 2020-10-16 LAB — CALPROTECTIN, FECAL: 232 UG/G — HIGH (ref 0–120)

## 2020-11-13 ENCOUNTER — OFFICE (OUTPATIENT)
Dept: URBAN - METROPOLITAN AREA CLINIC 102 | Facility: CLINIC | Age: 55
End: 2020-11-13
Payer: COMMERCIAL

## 2020-11-13 DIAGNOSIS — D50.9 IRON DEFICIENCY ANEMIA, UNSPECIFIED: ICD-10-CM

## 2020-11-13 PROCEDURE — 96365 THER/PROPH/DIAG IV INF INIT: CPT | Performed by: INTERNAL MEDICINE

## 2020-11-20 ENCOUNTER — OFFICE (OUTPATIENT)
Dept: URBAN - METROPOLITAN AREA CLINIC 34 | Facility: CLINIC | Age: 55
End: 2020-11-20
Payer: COMMERCIAL

## 2020-11-20 DIAGNOSIS — D50.9 IRON DEFICIENCY ANEMIA, UNSPECIFIED: ICD-10-CM

## 2020-11-20 PROCEDURE — 96365 THER/PROPH/DIAG IV INF INIT: CPT | Performed by: INTERNAL MEDICINE

## 2020-12-21 ENCOUNTER — OFFICE (OUTPATIENT)
Dept: URBAN - METROPOLITAN AREA TELEHEALTH 12 | Facility: TELEHEALTH | Age: 55
End: 2020-12-21
Payer: COMMERCIAL

## 2020-12-21 VITALS — HEIGHT: 74 IN | WEIGHT: 175 LBS

## 2020-12-21 DIAGNOSIS — K51.90 ULCERATIVE COLITIS, UNSPECIFIED, WITHOUT COMPLICATIONS: ICD-10-CM

## 2020-12-21 PROCEDURE — 99214 OFFICE O/P EST MOD 30 MIN: CPT | Mod: GQ | Performed by: INTERNAL MEDICINE

## 2020-12-21 NOTE — SERVICEHPINOTES
PATIENT VERIFIED BY DATE OF BIRTH AND NAME. Patient has been consented for this telecommunication visit.   CLAUDE MENDOZA   is a   55   male who complains of worsening GI issues over the past 6 weeks or so. Initially he noted softer, slightly more frequent stools. However it has progressed to soft/liquid stools with mucus/blood. He has noted urgency and is defecating 10-15 times a day. He notes abdominal discomfort/pain which he describes as gas pain and this usually improves after a BM. He denies any nausea, vomiting, loss of appetite. He denies any recent weight loss. He had lost about 40 lbs after his hospitalization but regained about 20 lbs and has been stable since. He denies any fevers/chills. He has the order for C diff testing. He started prednisone a couple of days ago and restarted mesalamine suppositories about 2 weeks ago. The suppositories have not been helping that much. He was started on remicade but had a skin reaction after 1st dose and had LE swelling so we stopped that. He was doing well so remicade was held because of side effects and he went to see cardiology. His cardiac tests came back okay. ROS as above, otherwise remainder of ROS is negative.

## 2020-12-21 NOTE — SERVICENOTES
Patient's visit was conducted initially through video telecommunication but was switched to phone because of technical difficulties. Patient consented before the start of visit as to understanding of privacy concerns, possible technological failure, and their responsibility of carrying out instructions of plan.

Patient understands and agrees with plan. Questions/concerns addressed.

## 2021-01-22 ENCOUNTER — OFFICE (OUTPATIENT)
Dept: URBAN - METROPOLITAN AREA CLINIC 34 | Facility: CLINIC | Age: 56
End: 2021-01-22

## 2021-01-22 DIAGNOSIS — K51.90 ULCERATIVE COLITIS, UNSPECIFIED, WITHOUT COMPLICATIONS: ICD-10-CM

## 2021-01-22 PROCEDURE — 96413 CHEMO IV INFUSION 1 HR: CPT | Performed by: INTERNAL MEDICINE

## 2021-02-05 ENCOUNTER — OFFICE (OUTPATIENT)
Dept: URBAN - METROPOLITAN AREA CLINIC 102 | Facility: CLINIC | Age: 56
End: 2021-02-05

## 2021-02-05 DIAGNOSIS — K51.90 ULCERATIVE COLITIS, UNSPECIFIED, WITHOUT COMPLICATIONS: ICD-10-CM

## 2021-02-05 PROCEDURE — 96413 CHEMO IV INFUSION 1 HR: CPT | Performed by: INTERNAL MEDICINE

## 2022-01-18 ENCOUNTER — OFFICE (OUTPATIENT)
Dept: URBAN - METROPOLITAN AREA TELEHEALTH 12 | Facility: TELEHEALTH | Age: 57
End: 2022-01-18

## 2022-01-18 VITALS — HEIGHT: 74 IN | WEIGHT: 180 LBS

## 2022-01-18 DIAGNOSIS — K51.90 ULCERATIVE COLITIS, UNSPECIFIED, WITHOUT COMPLICATIONS: ICD-10-CM

## 2022-01-18 PROCEDURE — 99214 OFFICE O/P EST MOD 30 MIN: CPT | Mod: 95 | Performed by: INTERNAL MEDICINE

## 2022-01-18 RX ORDER — MESALAMINE 1.2 G/1
TABLET, DELAYED RELEASE ORAL
Qty: 360 | Refills: 3 | Status: COMPLETED
End: 2024-06-07

## 2022-01-18 NOTE — SERVICEHPINOTES
PATIENT VERIFIED BY DATE OF BIRTH AND NAME. Patient has been consented for this telecommunication visit.   CLAUDE MENDOZA   is a   56   male who presents via telemedicine for follow up of ulcerative colitis. He was started on entyvio last year but then stopped because of sinus infection/pneumonia. He also states he had a full body rash from entyvio. He never completed the loading doses of entyvio. He states his BMs are managemeable at this time. He has no watery diarrhea but he has 3-4 soft/loose BMs per day without any blood. He is on mesalamine 3.6 grams daily. He is overall hesitant to try another biologic. He denies any abdominal pain, nausea, vomiting, constipation, melena, rectal bleeding, loss of appetite, weight loss. He has noted some increased gas at times.
br
br ROS as above, otherwise remainder of ROS is negative.   br
br

## 2022-01-21 LAB
AMBIG ABBREV CMP14 DEFAULT: (no result)
C-REACTIVE PROTEIN, QUANT: 5 MG/L (ref 0–10)
CALPROTECTIN, FECAL: 269 UG/G — HIGH (ref 0–120)
CBC/DIFF AMBIGUOUS DEFAULT: BASO (ABSOLUTE): 0 X10E3/UL (ref 0–0.2)
CBC/DIFF AMBIGUOUS DEFAULT: BASOS: 0 %
CBC/DIFF AMBIGUOUS DEFAULT: EOS (ABSOLUTE): 0.2 X10E3/UL (ref 0–0.4)
CBC/DIFF AMBIGUOUS DEFAULT: EOS: 2 %
CBC/DIFF AMBIGUOUS DEFAULT: HEMATOCRIT: 43.3 % (ref 37.5–51)
CBC/DIFF AMBIGUOUS DEFAULT: HEMATOLOGY COMMENTS: (no result)
CBC/DIFF AMBIGUOUS DEFAULT: HEMOGLOBIN: 14.6 G/DL (ref 13–17.7)
CBC/DIFF AMBIGUOUS DEFAULT: IMMATURE CELLS: (no result)
CBC/DIFF AMBIGUOUS DEFAULT: IMMATURE GRANS (ABS): 0 X10E3/UL (ref 0–0.1)
CBC/DIFF AMBIGUOUS DEFAULT: IMMATURE GRANULOCYTES: 0 %
CBC/DIFF AMBIGUOUS DEFAULT: LYMPHS (ABSOLUTE): 1.7 X10E3/UL (ref 0.7–3.1)
CBC/DIFF AMBIGUOUS DEFAULT: LYMPHS: 17 %
CBC/DIFF AMBIGUOUS DEFAULT: MCH: 31.7 PG (ref 26.6–33)
CBC/DIFF AMBIGUOUS DEFAULT: MCHC: 33.7 G/DL (ref 31.5–35.7)
CBC/DIFF AMBIGUOUS DEFAULT: MCV: 94 FL (ref 79–97)
CBC/DIFF AMBIGUOUS DEFAULT: MONOCYTES(ABSOLUTE): 1.1 X10E3/UL — HIGH (ref 0.1–0.9)
CBC/DIFF AMBIGUOUS DEFAULT: MONOCYTES: 11 %
CBC/DIFF AMBIGUOUS DEFAULT: NEUTROPHILS (ABSOLUTE): 7 X10E3/UL (ref 1.4–7)
CBC/DIFF AMBIGUOUS DEFAULT: NEUTROPHILS: 70 %
CBC/DIFF AMBIGUOUS DEFAULT: NRBC: (no result)
CBC/DIFF AMBIGUOUS DEFAULT: PLATELETS: 315 X10E3/UL (ref 150–450)
CBC/DIFF AMBIGUOUS DEFAULT: RBC: 4.61 X10E6/UL (ref 4.14–5.8)
CBC/DIFF AMBIGUOUS DEFAULT: RDW: 12.7 % (ref 11.6–15.4)
CBC/DIFF AMBIGUOUS DEFAULT: WBC: 10 X10E3/UL (ref 3.4–10.8)
COMP. METABOLIC PANEL (14): A/G RATIO: 1.5 (ref 1.2–2.2)
COMP. METABOLIC PANEL (14): ALBUMIN: 4.5 G/DL (ref 3.8–4.9)
COMP. METABOLIC PANEL (14): ALKALINE PHOSPHATASE: 102 IU/L (ref 44–121)
COMP. METABOLIC PANEL (14): ALT (SGPT): 19 IU/L (ref 0–44)
COMP. METABOLIC PANEL (14): AST (SGOT): 22 IU/L (ref 0–40)
COMP. METABOLIC PANEL (14): BILIRUBIN, TOTAL: <0.2 MG/DL
COMP. METABOLIC PANEL (14): BUN/CREATININE RATIO: 17 (ref 9–20)
COMP. METABOLIC PANEL (14): BUN: 14 MG/DL (ref 6–24)
COMP. METABOLIC PANEL (14): CALCIUM: 10.1 MG/DL (ref 8.7–10.2)
COMP. METABOLIC PANEL (14): CARBON DIOXIDE, TOTAL: 27 MMOL/L (ref 20–29)
COMP. METABOLIC PANEL (14): CHLORIDE: 102 MMOL/L (ref 96–106)
COMP. METABOLIC PANEL (14): CREATININE: 0.83 MG/DL (ref 0.76–1.27)
COMP. METABOLIC PANEL (14): EGFR IF AFRICN AM: 115 ML/MIN/1.73 (ref 59–?)
COMP. METABOLIC PANEL (14): EGFR IF NONAFRICN AM: 99 ML/MIN/1.73 (ref 59–?)
COMP. METABOLIC PANEL (14): GLOBULIN, TOTAL: 3 G/DL (ref 1.5–4.5)
COMP. METABOLIC PANEL (14): GLUCOSE: 95 MG/DL (ref 65–99)
COMP. METABOLIC PANEL (14): POTASSIUM: 4.6 MMOL/L (ref 3.5–5.2)
COMP. METABOLIC PANEL (14): PROTEIN, TOTAL: 7.5 G/DL (ref 6–8.5)
COMP. METABOLIC PANEL (14): SODIUM: 140 MMOL/L (ref 134–144)
SEDIMENTATION RATE-WESTERGREN: 33 MM/HR — HIGH (ref 0–30)

## 2024-03-29 ENCOUNTER — OFFICE (OUTPATIENT)
Dept: URBAN - METROPOLITAN AREA CLINIC 34 | Facility: CLINIC | Age: 59
End: 2024-03-29

## 2024-03-29 PROCEDURE — 00031: CPT | Performed by: INTERNAL MEDICINE

## 2024-06-07 ENCOUNTER — OFFICE (OUTPATIENT)
Dept: URBAN - METROPOLITAN AREA PATHOLOGY 3 | Facility: PATHOLOGY | Age: 59
End: 2024-06-07

## 2024-06-07 ENCOUNTER — OFFICE (OUTPATIENT)
Dept: URBAN - METROPOLITAN AREA CLINIC 30 | Facility: CLINIC | Age: 59
End: 2024-06-07

## 2024-06-07 VITALS
HEART RATE: 68 BPM | TEMPERATURE: 96.9 F | HEART RATE: 72 BPM | HEART RATE: 74 BPM | TEMPERATURE: 98.4 F | DIASTOLIC BLOOD PRESSURE: 60 MMHG | RESPIRATION RATE: 15 BRPM | HEART RATE: 66 BPM | RESPIRATION RATE: 21 BRPM | SYSTOLIC BLOOD PRESSURE: 119 MMHG | SYSTOLIC BLOOD PRESSURE: 115 MMHG | RESPIRATION RATE: 14 BRPM | HEART RATE: 59 BPM | DIASTOLIC BLOOD PRESSURE: 72 MMHG | RESPIRATION RATE: 16 BRPM | HEART RATE: 56 BPM | DIASTOLIC BLOOD PRESSURE: 81 MMHG | SYSTOLIC BLOOD PRESSURE: 116 MMHG | OXYGEN SATURATION: 96 % | HEART RATE: 83 BPM | DIASTOLIC BLOOD PRESSURE: 70 MMHG | HEART RATE: 58 BPM | SYSTOLIC BLOOD PRESSURE: 155 MMHG | HEART RATE: 78 BPM | OXYGEN SATURATION: 99 % | HEART RATE: 71 BPM | SYSTOLIC BLOOD PRESSURE: 118 MMHG | DIASTOLIC BLOOD PRESSURE: 56 MMHG | DIASTOLIC BLOOD PRESSURE: 74 MMHG | RESPIRATION RATE: 19 BRPM | DIASTOLIC BLOOD PRESSURE: 78 MMHG | HEART RATE: 64 BPM | OXYGEN SATURATION: 100 % | DIASTOLIC BLOOD PRESSURE: 85 MMHG | DIASTOLIC BLOOD PRESSURE: 76 MMHG | SYSTOLIC BLOOD PRESSURE: 134 MMHG | RESPIRATION RATE: 12 BRPM | WEIGHT: 190 LBS | SYSTOLIC BLOOD PRESSURE: 128 MMHG | HEIGHT: 74 IN | SYSTOLIC BLOOD PRESSURE: 112 MMHG | SYSTOLIC BLOOD PRESSURE: 126 MMHG | OXYGEN SATURATION: 98 % | HEART RATE: 65 BPM | DIASTOLIC BLOOD PRESSURE: 69 MMHG | HEART RATE: 76 BPM | DIASTOLIC BLOOD PRESSURE: 68 MMHG

## 2024-06-07 DIAGNOSIS — K63.5 POLYP OF COLON: ICD-10-CM

## 2024-06-07 DIAGNOSIS — K52.9 NONINFECTIVE GASTROENTERITIS AND COLITIS, UNSPECIFIED: ICD-10-CM

## 2024-06-07 DIAGNOSIS — K51.90 ULCERATIVE COLITIS, UNSPECIFIED, WITHOUT COMPLICATIONS: ICD-10-CM

## 2024-06-07 DIAGNOSIS — K56.2 VOLVULUS: ICD-10-CM

## 2024-06-07 DIAGNOSIS — K63.89 OTHER SPECIFIED DISEASES OF INTESTINE: ICD-10-CM

## 2024-06-07 PROCEDURE — 88305 TISSUE EXAM BY PATHOLOGIST: CPT | Performed by: PATHOLOGY

## 2024-08-01 ENCOUNTER — TELEHEALTH PROVIDED OTHER THAN IN PATIENT'S HOME (OUTPATIENT)
Dept: URBAN - METROPOLITAN AREA TELEHEALTH 7 | Facility: TELEHEALTH | Age: 59
End: 2024-08-01
Payer: COMMERCIAL

## 2024-08-01 VITALS — WEIGHT: 185 LBS | HEIGHT: 74 IN

## 2024-08-01 DIAGNOSIS — K51.90 ULCERATIVE COLITIS, UNSPECIFIED, WITHOUT COMPLICATIONS: ICD-10-CM

## 2024-08-01 PROCEDURE — 99213 OFFICE O/P EST LOW 20 MIN: CPT | Mod: 95 | Performed by: INTERNAL MEDICINE

## 2024-08-01 NOTE — SERVICENOTES
Patient was located in their home during visit., Patient's visit was initially conducted through [employer] video telecommunication and then switched to telephone due to audio issues. Patient consented before the start of visit as to understanding of privacy concerns, possible technological failure, and their responsibility of carrying out instructions of plan., I spent 20 minutes today reviewing the chart, talking with the patient, reviewing previous results with patient, discussing plan, and documenting. Patient understands and agrees with plan. Questions/concerns addressed.

## 2024-08-01 NOTE — SERVICEHPINOTES
PATIENT VERIFIED BY DATE OF BIRTH AND NAME. Patient has been consented for this telecommunication visit using [employer] application.   CLAUDE MENDOZA   is a   59   male who presents for follow up of UC. He has not been seen since 2022. However, he did have colonoscopy a couple of months ago which revealed increased chronic stromal cells but no active inflammation. He had started remicade in the past but had a reaction after 1st infusion. He then had an allergic skin reaction to entyvio (had 2 infusions but never finished loading dose). He was put back on mesalamine but stopped taking it shortly after last visit. His last fecal calpro in March 2022 was 132. Currently he does not take any medications for UC. He has been having 1 solid BM a day. No blood in stool. No melena. No abdominal pain, nausea, vomiting, loss of appetite, weight loss. He is just taking a multivitamin.  He did not drastically change in diet or lifestyle. 
br
br ROS as above, otherwise remainder of ROS is negative.

## 2025-06-11 NOTE — SERVICEHPINOTES
Referral: Urine, incontinence, stress female [N39.3]  (Established with Dr Durán)    Patient asked if she could see a female?  Can pt be scheduled with Rose Marie Garrison?    Please advise    CLAUDE MENDOZA   is a   51   year old male who is being seen in consultation at the request of   Umesh Argueta   for elevated liver enzymes.  12/29/2016 AST-57, ALT-114, alk phos-96, total bili-0.5.  1/13/17 Iron sat-33%, LKM-1 ab-negative, GOLDEN-negative, ferritin-84.69, hep C ab-negative, hep b surface antigen-non reactive, hep b core total ab-non reactive. His only medications are Lialda and ASA. Denies herbal supplements. Denies IVDA. No prior blood transfusions. No family history of liver disease. He drinks beer socially once or twice a week.  He has a BM daily. Stools are [place]ick stool scale type 4. No blood present. Denies abdominal pain. Denies joint pain, vision changes, night sweats and rashes. Takes Lialda 1.2g 3 tablets daily.